# Patient Record
Sex: FEMALE | Race: WHITE | NOT HISPANIC OR LATINO | Employment: OTHER | URBAN - METROPOLITAN AREA
[De-identification: names, ages, dates, MRNs, and addresses within clinical notes are randomized per-mention and may not be internally consistent; named-entity substitution may affect disease eponyms.]

---

## 2023-09-20 ENCOUNTER — HOSPITAL ENCOUNTER (OUTPATIENT)
Dept: RADIOLOGY | Facility: OTHER | Age: 78
Discharge: HOME | End: 2023-09-20
Attending: FAMILY MEDICINE
Payer: MEDICARE

## 2023-09-20 DIAGNOSIS — Z78.0 ASYMPTOMATIC MENOPAUSAL STATE: ICD-10-CM

## 2023-09-20 DIAGNOSIS — S20.219A BRUISED RIB: ICD-10-CM

## 2023-09-20 DIAGNOSIS — M54.9 BACK PAIN: ICD-10-CM

## 2023-09-20 PROCEDURE — 72100 X-RAY EXAM L-S SPINE 2/3 VWS: CPT | Mod: TC,FY

## 2023-09-20 PROCEDURE — 72100 XR LUMBAR SPINE 2 OR 3 VIEWS: ICD-10-PCS | Mod: 26,,, | Performed by: RADIOLOGY

## 2023-09-20 PROCEDURE — 71110 X-RAY EXAM RIBS BIL 3 VIEWS: CPT | Mod: TC,FY

## 2023-09-20 PROCEDURE — 77080 DXA BONE DENSITY AXIAL: CPT | Mod: 26,,, | Performed by: RADIOLOGY

## 2023-09-20 PROCEDURE — 72100 X-RAY EXAM L-S SPINE 2/3 VWS: CPT | Mod: 26,,, | Performed by: RADIOLOGY

## 2023-09-20 PROCEDURE — 77080 DXA BONE DENSITY AXIAL SKELETON 1 OR MORE SITES: ICD-10-PCS | Mod: 26,,, | Performed by: RADIOLOGY

## 2023-09-20 PROCEDURE — 77080 DXA BONE DENSITY AXIAL: CPT | Mod: TC

## 2023-09-20 PROCEDURE — 71110 X-RAY EXAM RIBS BIL 3 VIEWS: CPT | Mod: 26,,, | Performed by: RADIOLOGY

## 2023-09-20 PROCEDURE — 71110 XR RIBS 3 VIEWS BILATERAL: ICD-10-PCS | Mod: 26,,, | Performed by: RADIOLOGY

## 2024-06-07 ENCOUNTER — HOSPITAL ENCOUNTER (OUTPATIENT)
Dept: RADIOLOGY | Facility: OTHER | Age: 79
Discharge: HOME | End: 2024-06-07
Attending: ORTHOPAEDIC SURGERY
Payer: MEDICARE

## 2024-06-07 DIAGNOSIS — M54.59 OTHER LOW BACK PAIN: ICD-10-CM

## 2024-06-07 PROCEDURE — 72158 MRI LUMBAR SPINE W/O & W/DYE: CPT | Mod: 26,,, | Performed by: RADIOLOGY

## 2024-06-07 PROCEDURE — A9585 GADOBUTROL INJECTION: HCPCS | Performed by: ORTHOPAEDIC SURGERY

## 2024-06-07 PROCEDURE — 25500020 PHARM REV CODE 255: Performed by: ORTHOPAEDIC SURGERY

## 2024-06-07 PROCEDURE — 72158 MRI LUMBAR SPINE W/O & W/DYE: CPT | Mod: TC

## 2024-06-07 RX ORDER — GADOBUTROL 604.72 MG/ML
7 INJECTION INTRAVENOUS
Status: COMPLETED | OUTPATIENT
Start: 2024-06-07 | End: 2024-06-07

## 2024-06-07 RX ADMIN — GADOBUTROL 7 ML: 604.72 INJECTION INTRAVENOUS at 04:06

## 2024-09-04 ENCOUNTER — HOSPITAL ENCOUNTER (OUTPATIENT)
Dept: RADIOLOGY | Facility: OTHER | Age: 79
Discharge: HOME | End: 2024-09-04
Attending: FAMILY MEDICINE
Payer: MEDICARE

## 2024-09-04 DIAGNOSIS — Z01.818 PREOP EXAMINATION: ICD-10-CM

## 2024-09-04 PROCEDURE — 71046 X-RAY EXAM CHEST 2 VIEWS: CPT | Mod: 26,,, | Performed by: RADIOLOGY

## 2024-09-04 PROCEDURE — 71046 X-RAY EXAM CHEST 2 VIEWS: CPT | Mod: TC,FY

## 2025-02-19 ENCOUNTER — OFFICE VISIT (OUTPATIENT)
Dept: OTOLARYNGOLOGY | Facility: CLINIC | Age: 80
End: 2025-02-19
Payer: MEDICARE

## 2025-02-19 VITALS — WEIGHT: 151 LBS | DIASTOLIC BLOOD PRESSURE: 61 MMHG | HEART RATE: 72 BPM | SYSTOLIC BLOOD PRESSURE: 121 MMHG

## 2025-02-19 DIAGNOSIS — R60.0 SWELLING OF LEFT PAROTID GLAND: Primary | ICD-10-CM

## 2025-02-19 PROCEDURE — 99999 PR PBB SHADOW E&M-EST. PATIENT-LVL III: CPT | Mod: PBBFAC,,, | Performed by: PHYSICIAN ASSISTANT

## 2025-02-19 RX ORDER — AMOXICILLIN 875 MG/1
TABLET, FILM COATED ORAL
COMMUNITY

## 2025-02-19 RX ORDER — ACETAMINOPHEN 500 MG
TABLET ORAL
COMMUNITY

## 2025-02-19 RX ORDER — ASPIRIN 81 MG/1
1 TABLET ORAL DAILY
COMMUNITY

## 2025-02-19 RX ORDER — PSYLLIUM HUSK 0.4 G
CAPSULE ORAL
COMMUNITY

## 2025-02-19 RX ORDER — ASCORBIC ACID 500 MG
TABLET ORAL
COMMUNITY
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
## 2025-02-24 ENCOUNTER — TELEPHONE (OUTPATIENT)
Dept: OTOLARYNGOLOGY | Facility: CLINIC | Age: 80
End: 2025-02-24
Payer: MEDICARE

## 2025-02-24 RX ORDER — AMOXICILLIN AND CLAVULANATE POTASSIUM 875; 125 MG/1; MG/1
1 TABLET, FILM COATED ORAL EVERY 12 HOURS
Qty: 20 TABLET | Refills: 0 | Status: SHIPPED | OUTPATIENT
Start: 2025-02-24 | End: 2025-02-24

## 2025-02-24 RX ORDER — AMOXICILLIN AND CLAVULANATE POTASSIUM 875; 125 MG/1; MG/1
1 TABLET, FILM COATED ORAL EVERY 12 HOURS
Qty: 20 TABLET | Refills: 0 | Status: SHIPPED | OUTPATIENT
Start: 2025-02-24 | End: 2025-03-06

## 2025-02-24 NOTE — TELEPHONE ENCOUNTER
Called patient to inform her antibiotics were called in for her. Also attempted to reschedule ultrasound as her appointment had been cancelled. Patient stated that she feels she doesn't need ultrasound at this time. Patient wanted to try course of antibiotics first before and did not want to reschedule ultrasound. Told patient to call the office back if she feels she needs to have ultrasound.     ----- Message from Mary Torres PA-C sent at 2/24/2025  8:17 AM CST -----  Regarding: RE: Prescription  I sent in Augmentin. Can you call and let her know? Also - can you get her ultrasound rescheduled? Looks like she cancelled her appt for thatThanksClaire  ----- Message -----  From: Sonia Greer RN  Sent: 2/24/2025   8:13 AM CST  To: Mary Torres PA-C  Subject: FW: Prescription                                   ----- Message -----  From: Citlali Santamaria  Sent: 2/24/2025   8:12 AM CST  To: Brian Hernandez Staff  Subject: Prescription                                     Pt is calling states a prescription is needed for her infected gland. Pt says she was seen 2/19. Needs medication phoned in before parades start. Patient Requesting Call Back @  [id number] - [place], [place] - [address] AT [facility] & [id number] [id number]: [phone] Fax: [phone]

## 2025-03-12 ENCOUNTER — HOSPITAL ENCOUNTER (OUTPATIENT)
Dept: RADIOLOGY | Facility: OTHER | Age: 80
Discharge: HOME | End: 2025-03-12
Attending: PHYSICIAN ASSISTANT
Payer: MEDICARE

## 2025-03-12 ENCOUNTER — RESULTS FOLLOW-UP (OUTPATIENT)
Dept: OTOLARYNGOLOGY | Facility: CLINIC | Age: 80
End: 2025-03-12

## 2025-03-12 DIAGNOSIS — R60.0 SWELLING OF LEFT PAROTID GLAND: Primary | ICD-10-CM

## 2025-03-12 DIAGNOSIS — R60.0 SWELLING OF LEFT PAROTID GLAND: ICD-10-CM

## 2025-03-12 PROCEDURE — 76536 US EXAM OF HEAD AND NECK: CPT | Mod: 26,,, | Performed by: INTERNAL MEDICINE

## 2025-03-12 PROCEDURE — 76536 US EXAM OF HEAD AND NECK: CPT | Mod: TC

## 2025-03-13 ENCOUNTER — LAB VISIT (OUTPATIENT)
Dept: LAB | Facility: OTHER | Age: 80
End: 2025-03-13
Attending: PHYSICIAN ASSISTANT
Payer: MEDICARE

## 2025-03-13 ENCOUNTER — TELEPHONE (OUTPATIENT)
Dept: OTOLARYNGOLOGY | Facility: CLINIC | Age: 80
End: 2025-03-13
Payer: MEDICARE

## 2025-03-13 DIAGNOSIS — R60.0 SWELLING OF LEFT PAROTID GLAND: ICD-10-CM

## 2025-03-13 LAB
CREAT SERPL-MCNC: 0.8 MG/DL (ref 0.5–1.4)
EST. GFR  (NO RACE VARIABLE): >60 ML/MIN/1.73 M^2

## 2025-03-13 PROCEDURE — 36415 COLL VENOUS BLD VENIPUNCTURE: CPT | Performed by: PHYSICIAN ASSISTANT

## 2025-03-13 PROCEDURE — 82565 ASSAY OF CREATININE: CPT | Performed by: PHYSICIAN ASSISTANT

## 2025-03-13 NOTE — TELEPHONE ENCOUNTER
CT scan and accompanying blood work ordered. Left voicemail for patient to call back to schedule CT.     ----- Message from Mary Torres PA-C sent at 3/12/2025  3:57 PM CDT -----  CT ordered.     Please schedule creatinine and CT neck.   ----- Message -----  From: Interface, Rad Results In  Sent: 3/12/2025   3:44 PM CDT  To: Mary Torres PA-C

## 2025-03-14 ENCOUNTER — HOSPITAL ENCOUNTER (OUTPATIENT)
Dept: RADIOLOGY | Facility: OTHER | Age: 80
Discharge: HOME | End: 2025-03-14
Attending: PHYSICIAN ASSISTANT
Payer: MEDICARE

## 2025-03-14 DIAGNOSIS — R60.0 SWELLING OF LEFT PAROTID GLAND: ICD-10-CM

## 2025-03-14 PROCEDURE — 70491 CT SOFT TISSUE NECK W/DYE: CPT | Mod: 26,,, | Performed by: RADIOLOGY

## 2025-03-14 PROCEDURE — 25500020 PHARM REV CODE 255: Performed by: PHYSICIAN ASSISTANT

## 2025-03-14 PROCEDURE — 70491 CT SOFT TISSUE NECK W/DYE: CPT | Mod: TC

## 2025-03-14 RX ADMIN — IOHEXOL 75 ML: 350 INJECTION, SOLUTION INTRAVENOUS at 02:03

## 2025-03-17 ENCOUNTER — RESULTS FOLLOW-UP (OUTPATIENT)
Dept: OTOLARYNGOLOGY | Facility: CLINIC | Age: 80
End: 2025-03-17

## 2025-03-24 ENCOUNTER — NURSE TRIAGE (OUTPATIENT)
Dept: ADMINISTRATIVE | Facility: CLINIC | Age: 80
End: 2025-03-24
Payer: MEDICARE

## 2025-03-24 NOTE — TELEPHONE ENCOUNTER
Pt has a hx of swelling to her left parotid gland. The last 2 nights in the middle of the night, it is swelling worse. She wakes up with it very swollen and hard as a rock, looks like she has mumps. She puts a heading pad and it dissipates. Right now, it's not swollen because she's had a heating pad on it. She has been followed about this issue by otolaryngolo since February. Had ct and US and was told to notify if the swelling gets worse or any more frequent. Pt refuses triage. Requesting message be sent to her specialist's office to notify them of her concern.   Reason for Disposition   Health information question, no triage required and triager able to answer question    Protocols used: Information Only Call - No Triage-A-[place]

## 2025-03-25 ENCOUNTER — OFFICE VISIT (OUTPATIENT)
Dept: OTOLARYNGOLOGY | Facility: CLINIC | Age: 80
End: 2025-03-25
Payer: MEDICARE

## 2025-03-25 VITALS — SYSTOLIC BLOOD PRESSURE: 107 MMHG | WEIGHT: 147.69 LBS | HEART RATE: 73 BPM | DIASTOLIC BLOOD PRESSURE: 66 MMHG

## 2025-03-25 DIAGNOSIS — R60.0 SWELLING OF LEFT PAROTID GLAND: Primary | ICD-10-CM

## 2025-03-25 PROCEDURE — 99999 PR PBB SHADOW E&M-EST. PATIENT-LVL III: CPT | Mod: PBBFAC,,, | Performed by: OTOLARYNGOLOGY

## 2025-03-25 PROCEDURE — 3074F SYST BP LT 130 MM HG: CPT | Mod: CPTII,S$GLB,, | Performed by: OTOLARYNGOLOGY

## 2025-03-25 PROCEDURE — 3078F DIAST BP <80 MM HG: CPT | Mod: CPTII,S$GLB,, | Performed by: OTOLARYNGOLOGY

## 2025-03-25 PROCEDURE — 1159F MED LIST DOCD IN RCRD: CPT | Mod: CPTII,S$GLB,, | Performed by: OTOLARYNGOLOGY

## 2025-03-25 PROCEDURE — 1125F AMNT PAIN NOTED PAIN PRSNT: CPT | Mod: CPTII,S$GLB,, | Performed by: OTOLARYNGOLOGY

## 2025-03-25 PROCEDURE — 99214 OFFICE O/P EST MOD 30 MIN: CPT | Mod: S$GLB,,, | Performed by: OTOLARYNGOLOGY

## 2025-03-25 RX ORDER — SODIUM CHLORIDE 0.9 % (FLUSH) 0.9 %
10 SYRINGE (ML) INJECTION
OUTPATIENT
Start: 2025-03-25

## 2025-03-25 RX ORDER — LIDOCAINE HYDROCHLORIDE 10 MG/ML
1 INJECTION, SOLUTION EPIDURAL; INFILTRATION; INTRACAUDAL; PERINEURAL ONCE
OUTPATIENT
Start: 2025-03-25 | End: 2025-03-25
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
## 2025-03-25 NOTE — ASSESSMENT & PLAN NOTE
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  Recommended that we proceed to the operating room for diagnostic and possibly therapeutic salivary endoscopy.  She is amenable to surgery.  Surgery is scheduled on 04/30/2025.

## 2025-04-03 ENCOUNTER — TELEPHONE (OUTPATIENT)
Dept: OTOLARYNGOLOGY | Facility: CLINIC | Age: 80
End: 2025-04-03
Payer: MEDICARE

## 2025-04-03 NOTE — TELEPHONE ENCOUNTER
Returned call/ left voicemail    ----- Message from Cody Faulkner MD sent at 4/3/2025  3:42 PM CDT -----  Regarding: RE: Questions and concerns  Contact: [phone]  Could you call and see what's going on? Thanks.  ----- Message -----  From: Sonia Greer RN  Sent: 4/3/2025   3:38 PM CDT  To: Cody Faulkner MD  Subject: FW: Questions and concerns                         ----- Message -----  From: Autumn Gaffney  Sent: 4/3/2025   3:26 PM CDT  To: Kaushik Ross Staff  Subject: Questions and concerns                           Type:  Needs Medical AdviceWho Called: Adore the patient rather a call back or a response via [facility]? CallBest Call Back Number: [phone] Information:  Patient calling with questions about her procedure. Patient stated she has been calling since Monday

## 2025-04-04 ENCOUNTER — TELEPHONE (OUTPATIENT)
Dept: OTOLARYNGOLOGY | Facility: CLINIC | Age: 80
End: 2025-04-04
Payer: MEDICARE

## 2025-04-10 ENCOUNTER — TELEPHONE (OUTPATIENT)
Dept: OTOLARYNGOLOGY | Facility: CLINIC | Age: 80
End: 2025-04-10
Payer: MEDICARE

## 2025-04-10 NOTE — TELEPHONE ENCOUNTER
----- Message from Padmini sent at 4/9/2025 12:57 PM CDT -----  Regarding: Surgery Questions  Contact: [phone]  Type:  Needs Medical AdviceWho Called: RoseliaWould the patient rather a call back or a response via [facility]? callBest Call Back Number: [phone] Information: Calling to speak with nurse regarding vertigo/dizzines and upcoming procedure. Calling in regards to speaking with nurse to confirm report time and instructions for upcoming procedure/surgery. Please call back as soon as possible to confirm details.

## 2025-04-17 DIAGNOSIS — Z01.818 PRE-OP TESTING: Primary | ICD-10-CM

## 2025-04-17 RX ORDER — TEMAZEPAM 30 MG/1
30 CAPSULE ORAL NIGHTLY PRN
COMMUNITY
Start: 2024-09-04

## 2025-04-17 RX ORDER — BISOPROLOL FUMARATE AND HYDROCHLOROTHIAZIDE 10; 6.25 MG/1; MG/1
1 TABLET ORAL DAILY
COMMUNITY
End: 2025-04-17 | Stop reason: CLARIF

## 2025-04-17 RX ORDER — LOSARTAN POTASSIUM AND HYDROCHLOROTHIAZIDE 12.5; 5 MG/1; MG/1
1 TABLET ORAL DAILY
COMMUNITY

## 2025-04-17 NOTE — ANESTHESIA PAT ROS NOTE
04/17/2025  Roselia Cristobal is a 79 y.o., female.      Pre-op Assessment          Review of Systems  Anesthesia Hx:  No problems with previous Anesthesia   History of prior surgery of interest to airway management or planning:  Previous anesthesia: General ARTHRODESIS COMBINED TQ 1NTRSPC LUMBAR  WI MATA FACETECTOMY & FORAMOTOMY 1 VRT SGM LUMBAR  WI MATA FACETECTOMY&FORAMOT 1 VRT SGM EA ADDL SGM  WI MATA FACETECTOMY&FORAMOT 1 VRT SGM EA ADDL SGM  WI INSJ BIOMCHN DEV INTERVERTEBRAL DSC SPC W/ARTHRD  WI POSTERIOR NON-SEGMENTAL INSTRUMENTATION  WI AUTOGRAFT SPINE SURGERY LOCAL FROM SAME INCISION  WI ALLOGRAFT FOR SPINE SURGERY ONLY MORSELIZED  WI [facility] IP/OBS CARE SF/LOW MDM 40 MINUTES  FUSION LUMBAR POSTERIOR  LAMINECTOMY LUMBAR POSTERIOR  9/10/24 with general anesthesia.  Procedure performed at an [facility].      Airway issues documented on chart review include mask, easy, easy direct laryngoscopy , view on direct laryngoscopy Grade I    Denies Family Hx of Anesthesia complications.    Denies Personal Hx of Anesthesia complications.                    Hematology/Oncology:  Hematology Normal   Oncology Normal                                   EENT/Dental:   SWELLING OF LEFT PAROTID GLAND          Cardiovascular:     Hypertension       Denies Angina.     hyperlipidemia       Functional Capacity good / => 4 METS                         Pulmonary:       Denies Shortness of breath.  Denies Recent URI.                 Renal/:  Renal/ Normal                 Hepatic/GI:  Hepatic/GI Normal                    Musculoskeletal:  Arthritis        Bone Disorders:    Osteoporosis      Lumbar Spine Disorders, Lumbar Disc Disease, Radiculopathy   Neurological:  Neurology Normal                                      Endocrine:  Endocrine Normal            Psych:  Psychiatric Normal                          Anesthesia Assessment: Preoperative EQUATION    Planned Procedure: Procedure(s) (LRB):  SIALENDOSCOPY (Left)  Requested Anesthesia Type:General  Surgeon: Cody Faulkner MD  Service: ENT  Known or anticipated Date of Surgery:4/30/2025    Surgeon notes: reviewed    Electronic QUestionnaire Assessment completed via nurse interview with patient.        Triage considerations:     The patient has no apparent active cardiac condition (No unstable coronary Syndrome such as severe unstable angina or recent [<1 month] myocardial infarction, decompensated CHF, severe valvular   disease or significant arrhythmia)    Previous anesthesia records:GETA and No problems    ETT 09/10/24; 1150 (created via procedure documentation); Direct laryngoscopy; Oral Standard; 7 mm; Cuffed; Auscultation, Capnometry, Symmetrical chest wall movement; Parham       PLACEMENT:  Airway indications Anesthesia.  Spontaneous vent: present  Sedation level: Anesthesia    Preoxygenated: yes  Patient position: Supine  MILS maintained throughout  Mask difficulty assessment: Easy mask and Oral airway in place    timeout verbally confirmed by everyone present  Final Airway Details    Final airway type: Endotracheal airwaySuccessful airway: Oral ETT  Cuffed: Cuffed  Successful intubation technique: Direct laryngoscopy  Facilitating devices/methods: Stylet  Endotracheal tube insertion site: Oral  Blade: Parham  Blade size: #2  Placement verified by: auscultation, CO2 detection, chest rise and direct visualization  Breath Sounds: Equal  Cormack-Lehane Classification: grade I - full view of glottis  ETT size: 7.0mm  Cuff volume (mL): 4  Measured from: Lips  Lips/Dentition: Unchanged  Secured Method: Silk Tape Secured Location: Right    Airway     TM distance: >3 FB  Neck ROM: full       Last PCP note: outside [facility]   Subspecialty notes: ENT    Other important co-morbidities: HLD and HTN      Tests already available:  No recent tests.              Instructions given. (See in Nurse's note)    Optimization:  Anesthesia Preop Clinic Assessment NOT Indicated    Medical Opinion NOT Indicated             Plan:    Testing:  BMP, EKG, and Hematology Profile        Patient  has previously scheduled Medical Appointment: NOT AT THIS TIME    Navigation: Tests Scheduled.                           Results will be tracked by Preop Clinic.  4/29 Labs resulted and noted by Dr. Rina Pak. EKG resulted.

## 2025-04-25 ENCOUNTER — HOSPITAL ENCOUNTER (OUTPATIENT)
Dept: CARDIOLOGY | Facility: OTHER | Age: 80
Discharge: HOME | End: 2025-04-25
Attending: ANESTHESIOLOGY
Payer: MEDICARE

## 2025-04-25 DIAGNOSIS — Z01.818 PRE-OP TESTING: ICD-10-CM

## 2025-04-29 ENCOUNTER — TELEPHONE (OUTPATIENT)
Dept: OTOLARYNGOLOGY | Facility: CLINIC | Age: 80
End: 2025-04-29
Payer: MEDICARE

## 2025-04-29 NOTE — TELEPHONE ENCOUNTER
Patient confirmed an arrival time of 6:00am for her surgery on 4/30 with Dr. Faulkner.  NPO instructions reinforced and location details given.

## 2025-04-30 ENCOUNTER — ANESTHESIA (OUTPATIENT)
Dept: SURGERY | Facility: HOSPITAL | Age: 80
End: 2025-04-30
Payer: MEDICARE

## 2025-04-30 ENCOUNTER — HOSPITAL ENCOUNTER (OUTPATIENT)
Facility: HOSPITAL | Age: 80
Discharge: HOME | End: 2025-04-30
Attending: OTOLARYNGOLOGY | Admitting: OTOLARYNGOLOGY
Payer: MEDICARE

## 2025-04-30 ENCOUNTER — ANESTHESIA EVENT (OUTPATIENT)
Dept: SURGERY | Facility: HOSPITAL | Age: 80
End: 2025-04-30
Payer: MEDICARE

## 2025-04-30 VITALS
OXYGEN SATURATION: 99 % | HEIGHT: 63 IN | DIASTOLIC BLOOD PRESSURE: 63 MMHG | HEART RATE: 69 BPM | BODY MASS INDEX: 25.34 KG/M2 | TEMPERATURE: 98 F | SYSTOLIC BLOOD PRESSURE: 138 MMHG | WEIGHT: 143 LBS | RESPIRATION RATE: 14 BRPM

## 2025-04-30 DIAGNOSIS — R60.0 SWELLING OF LEFT PAROTID GLAND: Primary | ICD-10-CM

## 2025-04-30 PROCEDURE — 71000044 HC DOSC ROUTINE RECOVERY FIRST HOUR: Performed by: OTOLARYNGOLOGY

## 2025-04-30 PROCEDURE — 37000009 HC ANESTHESIA EA ADD 15 MINS: Performed by: OTOLARYNGOLOGY

## 2025-04-30 PROCEDURE — 71000015 HC POSTOP RECOV 1ST HR: Performed by: OTOLARYNGOLOGY

## 2025-04-30 PROCEDURE — 63600175 PHARM REV CODE 636 W HCPCS: Performed by: NURSE ANESTHETIST, CERTIFIED REGISTERED

## 2025-04-30 PROCEDURE — 25000003 PHARM REV CODE 250: Performed by: NURSE ANESTHETIST, CERTIFIED REGISTERED

## 2025-04-30 PROCEDURE — 37000008 HC ANESTHESIA 1ST 15 MINUTES: Performed by: OTOLARYNGOLOGY

## 2025-04-30 PROCEDURE — 36000707: Performed by: OTOLARYNGOLOGY

## 2025-04-30 PROCEDURE — 36000706: Performed by: OTOLARYNGOLOGY

## 2025-04-30 PROCEDURE — 63600175 PHARM REV CODE 636 W HCPCS: Performed by: OTOLARYNGOLOGY

## 2025-04-30 PROCEDURE — 42699 UNLISTED PX SALIVRY GLND/DUX: CPT | Mod: ,,, | Performed by: OTOLARYNGOLOGY

## 2025-04-30 RX ORDER — SODIUM CHLORIDE 0.9 % (FLUSH) 0.9 %
10 SYRINGE (ML) INJECTION
Status: DISCONTINUED | OUTPATIENT
Start: 2025-04-30 | End: 2025-04-30 | Stop reason: HOSPADM

## 2025-04-30 RX ORDER — ONDANSETRON 4 MG/1
4 TABLET, ORALLY DISINTEGRATING ORAL EVERY 6 HOURS PRN
Qty: 10 TABLET | Refills: 0 | Status: SHIPPED | OUTPATIENT
Start: 2025-04-30

## 2025-04-30 RX ORDER — CEFAZOLIN 2 G/1
2 INJECTION, POWDER, FOR SOLUTION INTRAMUSCULAR; INTRAVENOUS
Status: COMPLETED | OUTPATIENT
Start: 2025-04-30 | End: 2025-04-30

## 2025-04-30 RX ORDER — DEXMEDETOMIDINE HYDROCHLORIDE 100 UG/ML
INJECTION, SOLUTION INTRAVENOUS
Status: DISCONTINUED | OUTPATIENT
Start: 2025-04-30 | End: 2025-04-30

## 2025-04-30 RX ORDER — DEXTROMETHORPHAN HYDROBROMIDE, GUAIFENESIN 5; 100 MG/5ML; MG/5ML
650 LIQUID ORAL EVERY 8 HOURS
Qty: 15 TABLET | Refills: 0 | Status: SHIPPED | OUTPATIENT
Start: 2025-04-30 | End: 2025-05-05

## 2025-04-30 RX ORDER — FENTANYL CITRATE 50 UG/ML
INJECTION, SOLUTION INTRAMUSCULAR; INTRAVENOUS
Status: DISCONTINUED | OUTPATIENT
Start: 2025-04-30 | End: 2025-04-30

## 2025-04-30 RX ORDER — LIDOCAINE HYDROCHLORIDE 20 MG/ML
INJECTION INTRAVENOUS
Status: DISCONTINUED | OUTPATIENT
Start: 2025-04-30 | End: 2025-04-30

## 2025-04-30 RX ORDER — ROCURONIUM BROMIDE 10 MG/ML
INJECTION, SOLUTION INTRAVENOUS
Status: DISCONTINUED | OUTPATIENT
Start: 2025-04-30 | End: 2025-04-30

## 2025-04-30 RX ORDER — PHENYLEPHRINE HCL IN 0.9% NACL 1 MG/10 ML
SYRINGE (ML) INTRAVENOUS
Status: DISCONTINUED | OUTPATIENT
Start: 2025-04-30 | End: 2025-04-30

## 2025-04-30 RX ORDER — IBUPROFEN 600 MG/1
600 TABLET, FILM COATED ORAL EVERY 6 HOURS PRN
Qty: 15 TABLET | Refills: 0 | Status: SHIPPED | OUTPATIENT
Start: 2025-04-30

## 2025-04-30 RX ORDER — ONDANSETRON HYDROCHLORIDE 2 MG/ML
INJECTION, SOLUTION INTRAVENOUS
Status: DISCONTINUED | OUTPATIENT
Start: 2025-04-30 | End: 2025-04-30

## 2025-04-30 RX ORDER — PROPOFOL 10 MG/ML
VIAL (ML) INTRAVENOUS
Status: DISCONTINUED | OUTPATIENT
Start: 2025-04-30 | End: 2025-04-30

## 2025-04-30 RX ORDER — HYDROMORPHONE HYDROCHLORIDE 1 MG/ML
0.2 INJECTION, SOLUTION INTRAMUSCULAR; INTRAVENOUS; SUBCUTANEOUS EVERY 5 MIN PRN
Status: DISCONTINUED | OUTPATIENT
Start: 2025-04-30 | End: 2025-04-30 | Stop reason: HOSPADM

## 2025-04-30 RX ORDER — ONDANSETRON HYDROCHLORIDE 2 MG/ML
4 INJECTION, SOLUTION INTRAVENOUS DAILY PRN
Status: DISCONTINUED | OUTPATIENT
Start: 2025-04-30 | End: 2025-04-30 | Stop reason: HOSPADM

## 2025-04-30 RX ORDER — ONDANSETRON HYDROCHLORIDE 2 MG/ML
4 INJECTION, SOLUTION INTRAVENOUS ONCE AS NEEDED
Status: DISCONTINUED | OUTPATIENT
Start: 2025-04-30 | End: 2025-04-30 | Stop reason: HOSPADM

## 2025-04-30 RX ORDER — LIDOCAINE HYDROCHLORIDE 10 MG/ML
1 INJECTION, SOLUTION EPIDURAL; INFILTRATION; INTRACAUDAL; PERINEURAL ONCE
Status: DISCONTINUED | OUTPATIENT
Start: 2025-04-30 | End: 2025-04-30 | Stop reason: HOSPADM

## 2025-04-30 RX ORDER — DEXAMETHASONE SODIUM PHOSPHATE 4 MG/ML
INJECTION, SOLUTION INTRA-ARTICULAR; INTRALESIONAL; INTRAMUSCULAR; INTRAVENOUS; SOFT TISSUE
Status: DISCONTINUED | OUTPATIENT
Start: 2025-04-30 | End: 2025-04-30

## 2025-04-30 RX ORDER — IBUPROFEN 400 MG/1
600 TABLET, FILM COATED ORAL EVERY 6 HOURS PRN
Qty: 10 TABLET | Refills: 0 | Status: SHIPPED | OUTPATIENT
Start: 2025-04-30 | End: 2025-04-30

## 2025-04-30 RX ADMIN — SODIUM CHLORIDE: 0.9 INJECTION, SOLUTION INTRAVENOUS at 07:04

## 2025-04-30 RX ADMIN — ONDANSETRON 4 MG: 2 INJECTION INTRAMUSCULAR; INTRAVENOUS at 08:04

## 2025-04-30 RX ADMIN — DEXAMETHASONE SODIUM PHOSPHATE 4 MG: 4 INJECTION, SOLUTION INTRAMUSCULAR; INTRAVENOUS at 08:04

## 2025-04-30 RX ADMIN — FENTANYL CITRATE 25 MCG: 50 INJECTION, SOLUTION INTRAMUSCULAR; INTRAVENOUS at 07:04

## 2025-04-30 RX ADMIN — SUGAMMADEX 200 MG: 100 INJECTION, SOLUTION INTRAVENOUS at 08:04

## 2025-04-30 RX ADMIN — Medication 100 MCG: at 08:04

## 2025-04-30 RX ADMIN — ROCURONIUM BROMIDE 30 MG: 10 INJECTION, SOLUTION INTRAVENOUS at 08:04

## 2025-04-30 RX ADMIN — GLYCOPYRROLATE 0.2 MG: 0.2 INJECTION, SOLUTION INTRAMUSCULAR; INTRAVENOUS at 08:04

## 2025-04-30 RX ADMIN — CEFAZOLIN 2 G: 2 INJECTION, POWDER, FOR SOLUTION INTRAMUSCULAR; INTRAVENOUS at 08:04

## 2025-04-30 RX ADMIN — LIDOCAINE HYDROCHLORIDE 75 MG: 20 INJECTION INTRAVENOUS at 08:04

## 2025-04-30 RX ADMIN — FENTANYL CITRATE 75 MCG: 50 INJECTION, SOLUTION INTRAMUSCULAR; INTRAVENOUS at 08:04

## 2025-04-30 RX ADMIN — PROPOFOL 170 MG: 10 INJECTION, EMULSION INTRAVENOUS at 08:04

## 2025-04-30 RX ADMIN — DEXMEDETOMIDINE 4 MCG: 100 INJECTION, SOLUTION, CONCENTRATE INTRAVENOUS at 08:04

## 2025-04-30 NOTE — TRANSFER OF CARE
"Anesthesia Transfer of Care Note    Patient: Roselia Cristobal    Procedure(s) Performed: Procedure(s) (LRB):  SIALENDOSCOPY (Left)    Patient location: [facility]    Anesthesia Type: general    Transport from OR: Transported from OR on 6-10 L/min O2 by face mask with adequate spontaneous ventilation    Post pain: adequate analgesia    Post assessment: no apparent anesthetic complications    Post vital signs: stable    Level of consciousness: sedated    Nausea/Vomiting: no nausea/vomiting    Complications: none    Transfer of care protocol was followed      Last vitals: Visit Vitals  /72   Pulse 71   Temp 36.4 °C (97.5 °F)   Resp 16   Ht 5' 3" (1.6 m)   Wt 64.9 kg (143 lb)   SpO2 97%   Breastfeeding No   BMI 25.33 kg/m²     "

## 2025-04-30 NOTE — ANESTHESIA PROCEDURE NOTES
Intubation    Date/Time: 4/30/2025 8:14 AM    Performed by: Autumn Tompkins CRNA  Authorized by: Apollo Linn MD    Intubation:     Induction:  Intravenous    Intubated:  Postinduction    Mask Ventilation:  Easy with oral airway    Attempts:  1    Attempted By:  CRNA    Method of Intubation:  Video laryngoscopy    Blade:  Lee 3    Laryngeal View Grade: Grade I - full view of cords      Difficult Airway Encountered?: No      Complications:  None    Airway Device:  Oral endotracheal tube    Airway Device Size:  7.0    Style/Cuff Inflation:  Cuffed (inflated to minimal occlusive pressure)    Inflation Amount (mL):  5    Tube secured:  21    Secured at:  The lips    Placement Verified By:  Capnometry    Complicating Factors:  None    Findings Post-Intubation:  BS equal bilateral       Noted.
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
## 2025-04-30 NOTE — ANESTHESIA PREPROCEDURE EVALUATION
04/30/2025  Roselia Cristobal is a 79 y.o., female.      Pre-op Assessment          Review of Systems  Anesthesia Hx:  No problems with previous Anesthesia                Cardiovascular:     Hypertension    Denies CAD.                                          Pulmonary:     Denies Asthma.     Denies Sleep Apnea.                Renal/:   Denies Chronic Renal Disease.                Hepatic/GI:   Denies PUD.   Denies GERD. Denies Liver Disease.               Neurological:    Denies CVA.    Denies Seizures.                                Endocrine:  Denies Diabetes. Denies Hypothyroidism.              Physical Exam  General: Alert    Airway:  Mallampati: I   Mouth Opening: Normal  TM Distance: Normal  Tongue: Normal  Neck ROM: Normal ROM    Dental:  Dentures        Anesthesia Plan  Type of Anesthesia, risks & benefits discussed:    Anesthesia Type: Gen ETT  Intra-op Monitoring Plan: Standard ASA Monitors  Post Op Pain Control Plan: multimodal analgesia and IV/PO Opioids PRN  Induction:  IV  Airway Plan: Direct  Informed Consent: Informed consent signed with the Patient and all parties understand the risks and agree with anesthesia plan.  All questions answered.   ASA Score: 2    Ready For Surgery From Anesthesia Perspective.     .      
Yes

## 2025-04-30 NOTE — DISCHARGE INSTRUCTIONS
Diet as tolerated, would start with soft foods    Tylenol and motrin for pain as needed    Warm compresses to the left face to help with pain    Make sure to stay hydrated    Can eat sour candies to stimulate saliva production and flow of saliva from gland

## 2025-04-30 NOTE — BRIEF OP NOTE
[facility] - Surgery ([facility])  Brief Operative Note    Surgery Date: 4/30/2025     Surgeons and Role:     * Cody Faulkner MD - Primary     * Norbert Alfonso MD - Resident - Assisting        Pre-op Diagnosis:  Swelling of left parotid gland [R60.0]    Post-op Diagnosis:  Post-Op Diagnosis Codes:     * Swelling of left parotid gland [R60.0]    Procedure(s) (LRB):  SIALENDOSCOPY (Left)    Anesthesia: General    Operative Findings: L diagnostic parotid sialendoscopy    Estimated Blood Loss: * No values recorded between 4/30/2025  7:55 AM and 4/30/2025  8:45 AM *         Specimens:   Specimen (24h ago, onward)      None            * No specimens in log *        Discharge Note    OUTCOME: Patient tolerated treatment/procedure well without complication and is now ready for discharge.    DISPOSITION: Home or Self Care    FINAL DIAGNOSIS:  recurrent left parotitis    FOLLOWUP: In clinic    DISCHARGE INSTRUCTIONS:    Discharge Procedure Orders   Diet Dysphagia Mechanical Soft     Notify your health care provider if you experience any of the following:  temperature >100.4     Notify your health care provider if you experience any of the following:  persistent nausea and vomiting or diarrhea     Notify your health care provider if you experience any of the following:  severe uncontrolled pain     Notify your health care provider if you experience any of the following:  redness, tenderness, or signs of infection (pain, swelling, redness, odor or green/yellow discharge around incision site)     No dressing needed     Activity as tolerated
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
## 2025-05-01 NOTE — ANESTHESIA POSTPROCEDURE EVALUATION
Anesthesia Post Evaluation    Patient: Roselia Cristobal    Procedure(s) Performed: Procedure(s) (LRB):  SIALENDOSCOPY (Left)    Final Anesthesia Type: general      Patient location during evaluation: PACU  Patient participation: Yes- Able to Participate  Level of consciousness: awake  Post-procedure vital signs: reviewed and stable  Pain management: adequate  Airway patency: patent    PONV status at discharge: No PONV  Anesthetic complications: no      Cardiovascular status: blood pressure returned to baseline  Respiratory status: unassisted  Hydration status: euvolemic  Follow-up not needed.              Vitals Value Taken Time   /63 04/30/25 09:31   Temp 36.6 °C (97.8 °F) 04/30/25 08:46   Pulse 64 04/30/25 09:33   Resp 24 04/30/25 09:33   SpO2 97 % 04/30/25 09:33   Vitals shown include unfiled device data.      No case tracking events are documented in the log.      Pain/Brien Score: Brien Score: 9 (4/30/2025  9:15 AM)

## 2025-05-13 ENCOUNTER — OFFICE VISIT (OUTPATIENT)
Dept: OTOLARYNGOLOGY | Facility: CLINIC | Age: 80
End: 2025-05-13
Payer: MEDICARE

## 2025-05-13 VITALS — BODY MASS INDEX: 26.22 KG/M2 | WEIGHT: 148 LBS

## 2025-05-13 DIAGNOSIS — R60.0 SWELLING OF LEFT PAROTID GLAND: Primary | ICD-10-CM

## 2025-05-13 PROCEDURE — 1125F AMNT PAIN NOTED PAIN PRSNT: CPT | Mod: CPTII,S$GLB,, | Performed by: OTOLARYNGOLOGY

## 2025-05-13 PROCEDURE — 99024 POSTOP FOLLOW-UP VISIT: CPT | Mod: S$GLB,,, | Performed by: OTOLARYNGOLOGY

## 2025-05-13 PROCEDURE — 1159F MED LIST DOCD IN RCRD: CPT | Mod: CPTII,S$GLB,, | Performed by: OTOLARYNGOLOGY

## 2025-05-13 PROCEDURE — 99999 PR PBB SHADOW E&M-EST. PATIENT-LVL II: CPT | Mod: PBBFAC,,, | Performed by: OTOLARYNGOLOGY

## 2025-05-13 PROCEDURE — 1160F RVW MEDS BY RX/DR IN RCRD: CPT | Mod: CPTII,S$GLB,, | Performed by: OTOLARYNGOLOGY

## 2025-05-13 RX ORDER — AMOXICILLIN AND CLAVULANATE POTASSIUM 875; 125 MG/1; MG/1
1 TABLET, FILM COATED ORAL EVERY 12 HOURS
Qty: 20 TABLET | Refills: 0 | Status: SHIPPED | OUTPATIENT
Start: 2025-05-13

## 2025-05-13 RX ORDER — HYDROCODONE BITARTRATE AND ACETAMINOPHEN 7.5; 325 MG/1; MG/1
1 TABLET ORAL EVERY 6 HOURS PRN
Qty: 30 TABLET | Refills: 0 | Status: SHIPPED | OUTPATIENT
Start: 2025-05-13

## 2025-05-13 NOTE — PROGRESS NOTES
History of Present Illness    Ms. Cristobal presents today for follow up of left parotid swelling and pain two weeks status post diagnostic salivary endoscopy She experienced initial post-surgical swelling and pain following endoscopy. One week ago, she developed new swelling that progressed to golf ball size in the affected area with severe radiating pain. Due to pain and swelling, she needs to lie down by 6 PM, significantly impacting daily activities. She uses heating pad for symptom relief and maintains adequate hydration with increased water intake. History notable for fall resulting in dental trauma with subsequent tooth loss requiring extractions antecedent to parotid swelling    On exam, there was no significant enlargement of her left parotid gland.  It is nontender.  On ear exam, her left external auditory canal is patent without evidence of inflammation..         Assessment & Plan    R60.0 Swelling of left parotid gland    IMPRESSION:  1. Assessed condition as inflammation related to previous fall injury, likely due to scarring of salivary gland drainage system.  2. Explained that swelling is likely parotitis due to backed-up saliva caused by scarred drainage system from previous injury.  3. Evaluated need for potential future interventions, including possible gland removal if conservative measures fail.  4. Reassured patient that cancer is unlikely in this situation, contrary to anecdotal information shared by patient.    SWELLING OF LEFT PAROTID GLAND:  1. Augmentin for parotitis.  2. Started pain medication due to reported discomfort.  3. Ms. Cristobal to continue using heat instead of ice for comfort.  4. Follow up in 2 weeks to recheck condition.    LIFESTYLE CHANGES:  1. Ms. Cristobal to drink plenty of water.      This note was generated with the assistance of ambient listening technology. Verbal consent was obtained by the patient and accompanying visitor(s) for the recording of patient appointment to  facilitate this note. I attest to having reviewed and edited the generated note for accuracy, though some syntax or spelling errors may persist. Please contact the author of this note for any clarification.

## 2025-05-29 ENCOUNTER — OFFICE VISIT (OUTPATIENT)
Dept: OTOLARYNGOLOGY | Facility: CLINIC | Age: 80
End: 2025-05-29
Payer: MEDICARE

## 2025-05-29 VITALS
SYSTOLIC BLOOD PRESSURE: 102 MMHG | BODY MASS INDEX: 26.36 KG/M2 | HEART RATE: 67 BPM | WEIGHT: 148.81 LBS | DIASTOLIC BLOOD PRESSURE: 64 MMHG

## 2025-05-29 DIAGNOSIS — R51.9 FACIAL PAIN: Primary | ICD-10-CM

## 2025-05-29 DIAGNOSIS — R60.0 SWELLING OF LEFT PAROTID GLAND: ICD-10-CM

## 2025-05-29 PROCEDURE — 1159F MED LIST DOCD IN RCRD: CPT | Mod: CPTII,S$GLB,, | Performed by: OTOLARYNGOLOGY

## 2025-05-29 PROCEDURE — 3078F DIAST BP <80 MM HG: CPT | Mod: CPTII,S$GLB,, | Performed by: OTOLARYNGOLOGY

## 2025-05-29 PROCEDURE — 3074F SYST BP LT 130 MM HG: CPT | Mod: CPTII,S$GLB,, | Performed by: OTOLARYNGOLOGY

## 2025-05-29 PROCEDURE — 1125F AMNT PAIN NOTED PAIN PRSNT: CPT | Mod: CPTII,S$GLB,, | Performed by: OTOLARYNGOLOGY

## 2025-05-29 PROCEDURE — 1160F RVW MEDS BY RX/DR IN RCRD: CPT | Mod: CPTII,S$GLB,, | Performed by: OTOLARYNGOLOGY

## 2025-05-29 PROCEDURE — 99213 OFFICE O/P EST LOW 20 MIN: CPT | Mod: S$GLB,,, | Performed by: OTOLARYNGOLOGY

## 2025-05-29 PROCEDURE — 99999 PR PBB SHADOW E&M-EST. PATIENT-LVL III: CPT | Mod: PBBFAC,,, | Performed by: OTOLARYNGOLOGY

## 2025-05-29 RX ORDER — HYDROCODONE BITARTRATE AND ACETAMINOPHEN 7.5; 325 MG/1; MG/1
1 TABLET ORAL EVERY 6 HOURS PRN
Qty: 30 TABLET | Refills: 0 | Status: SHIPPED | OUTPATIENT
Start: 2025-05-29
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
## 2025-05-30 ENCOUNTER — HOSPITAL ENCOUNTER (OUTPATIENT)
Dept: RADIOLOGY | Facility: OTHER | Age: 80
Discharge: HOME | End: 2025-05-30
Attending: OTOLARYNGOLOGY
Payer: MEDICARE

## 2025-05-30 DIAGNOSIS — R51.9 FACIAL PAIN: ICD-10-CM

## 2025-05-30 PROCEDURE — 70553 MRI BRAIN STEM W/O & W/DYE: CPT | Mod: 26,,, | Performed by: RADIOLOGY

## 2025-05-30 PROCEDURE — 70553 MRI BRAIN STEM W/O & W/DYE: CPT | Mod: TC

## 2025-05-30 PROCEDURE — A9585 GADOBUTROL INJECTION: HCPCS | Performed by: OTOLARYNGOLOGY

## 2025-05-30 PROCEDURE — 25500020 PHARM REV CODE 255: Performed by: OTOLARYNGOLOGY

## 2025-05-30 RX ORDER — GADOBUTROL 604.72 MG/ML
7 INJECTION INTRAVENOUS
Status: COMPLETED | OUTPATIENT
Start: 2025-05-30 | End: 2025-05-30

## 2025-05-30 RX ADMIN — GADOBUTROL 7 ML: 604.72 INJECTION INTRAVENOUS at 06:05

## 2025-06-06 ENCOUNTER — RESULTS FOLLOW-UP (OUTPATIENT)
Dept: OTOLARYNGOLOGY | Facility: CLINIC | Age: 80
End: 2025-06-06

## 2025-06-06 DIAGNOSIS — R51.9 FACIAL PAIN: Primary | ICD-10-CM

## 2025-06-19 ENCOUNTER — TELEPHONE (OUTPATIENT)
Facility: CLINIC | Age: 80
End: 2025-06-19
Payer: MEDICARE

## 2025-06-19 NOTE — TELEPHONE ENCOUNTER
Called patient to inform the appt was scheduled incorrectly. No answer, left voicemail. Information will be sent to [facility] for scheduling.

## 2025-06-19 NOTE — TELEPHONE ENCOUNTER
Returned patients call to inform her info was sent to [facility] they'll reach out to get her schedule with them. No answer, left voicemail.    Copied from CRM #[id number]. Topic: General Inquiry - Patient Advice  >> Jun 19, 2025  9:25 AM Marylou wrote:  Type: Returning a call    Who left a message?duyen tidwell    When did the practice call?6/19/25    Does patient know what this is regarding:returning the call    Who called and best call back number:[phone]    Would the patient rather a call back or a response via [facility]? callback    Comments:

## 2025-06-27 ENCOUNTER — TELEPHONE (OUTPATIENT)
Dept: NEUROLOGY | Facility: CLINIC | Age: 80
End: 2025-06-27
Payer: MEDICARE

## 2025-06-27 NOTE — TELEPHONE ENCOUNTER
I spoke to the patient to offer an appointment. Patient stated she is not coming to this hospital. She also state she appalled and despite this hospital. Patient then hung up.

## 2025-08-18 ENCOUNTER — HOSPITAL ENCOUNTER (OUTPATIENT)
Dept: RADIOLOGY | Facility: OTHER | Age: 80
Discharge: HOME | End: 2025-08-18
Attending: ORTHOPAEDIC SURGERY
Payer: MEDICARE

## 2025-08-18 DIAGNOSIS — M54.59 OTHER LOW BACK PAIN: ICD-10-CM

## 2025-08-18 PROCEDURE — 72148 MRI LUMBAR SPINE W/O DYE: CPT | Mod: TC

## 2025-08-18 PROCEDURE — 72148 MRI LUMBAR SPINE W/O DYE: CPT | Mod: 26,,, | Performed by: RADIOLOGY

## (undated) DEVICE — SET IV ADMIN 15DROP 3 CARESITE

## (undated) DEVICE — KIT SINUS OMC

## (undated) DEVICE — SOL NACL 0.9% IV INJ 1000ML

## (undated) DEVICE — COTTON BALLS 1/2IN